# Patient Record
Sex: MALE | Race: WHITE
[De-identification: names, ages, dates, MRNs, and addresses within clinical notes are randomized per-mention and may not be internally consistent; named-entity substitution may affect disease eponyms.]

---

## 2018-05-20 ENCOUNTER — HOSPITAL ENCOUNTER (EMERGENCY)
Dept: HOSPITAL 88 - ER | Age: 23
LOS: 1 days | Discharge: HOME | End: 2018-05-21
Payer: SELF-PAY

## 2018-05-20 VITALS — HEIGHT: 75 IN | BODY MASS INDEX: 35.68 KG/M2 | WEIGHT: 287 LBS

## 2018-05-20 DIAGNOSIS — R09.1: Primary | ICD-10-CM

## 2018-05-20 DIAGNOSIS — R06.00: ICD-10-CM

## 2018-05-20 DIAGNOSIS — N30.90: ICD-10-CM

## 2018-05-20 LAB
ALBUMIN SERPL-MCNC: 4.1 G/DL (ref 3.5–5)
ALBUMIN/GLOB SERPL: 1.1 {RATIO} (ref 0.8–2)
ALP SERPL-CCNC: 61 IU/L (ref 40–150)
ALT SERPL-CCNC: 52 IU/L (ref 0–55)
AMPHETAMINES UR QL SCN>1000 NG/ML: NEGATIVE
ANION GAP SERPL CALC-SCNC: 14.3 MMOL/L (ref 8–16)
BACTERIA URNS QL MICRO: (no result) /HPF
BASOPHILS # BLD AUTO: 0 10*3/UL (ref 0–0.1)
BASOPHILS NFR BLD AUTO: 0.4 % (ref 0–1)
BENZODIAZ UR QL SCN: NEGATIVE
BILIRUB UR QL: NEGATIVE
BUN SERPL-MCNC: 11 MG/DL (ref 7–26)
BUN/CREAT SERPL: 13 (ref 6–25)
CALCIUM SERPL-MCNC: 9.3 MG/DL (ref 8.4–10.2)
CHLORIDE SERPL-SCNC: 109 MMOL/L (ref 98–107)
CK MB SERPL-MCNC: 0.7 NG/ML (ref 0–5)
CK SERPL-CCNC: 100 IU/L (ref 30–200)
CLARITY UR: (no result)
CO2 SERPL-SCNC: 22 MMOL/L (ref 22–29)
COLOR UR: YELLOW
DEPRECATED APTT PLAS QN: 27.5 SECONDS (ref 23.8–35.5)
DEPRECATED INR PLAS: 1.05
DEPRECATED NEUTROPHILS # BLD AUTO: 2.6 10*3/UL (ref 2.1–6.9)
DEPRECATED RBC URNS MANUAL-ACNC: (no result) /HPF (ref 0–5)
EGFRCR SERPLBLD CKD-EPI 2021: > 60 ML/MIN (ref 60–?)
EOSINOPHIL # BLD AUTO: 0.1 10*3/UL (ref 0–0.4)
EOSINOPHIL NFR BLD AUTO: 1.1 % (ref 0–6)
EPI CELLS URNS QL MICRO: (no result) /LPF
ERYTHROCYTE [DISTWIDTH] IN CORD BLOOD: 12.8 % (ref 11.7–14.4)
GLOBULIN PLAS-MCNC: 3.6 G/DL (ref 2.3–3.5)
GLUCOSE SERPLBLD-MCNC: 102 MG/DL (ref 74–118)
HCT VFR BLD AUTO: 46.9 % (ref 38.2–49.6)
HGB BLD-MCNC: 16 G/DL (ref 14–18)
KETONES UR QL STRIP.AUTO: NEGATIVE
LEUKOCYTE ESTERASE UR QL STRIP.AUTO: (no result)
LYMPHOCYTES # BLD: 1.5 10*3/UL (ref 1–3.2)
LYMPHOCYTES NFR BLD AUTO: 33 % (ref 18–39.1)
MCH RBC QN AUTO: 27.6 PG (ref 28–32)
MCHC RBC AUTO-ENTMCNC: 34.1 G/DL (ref 31–35)
MCV RBC AUTO: 80.9 FL (ref 81–99)
MONOCYTES # BLD AUTO: 0.5 10*3/UL (ref 0.2–0.8)
MONOCYTES NFR BLD AUTO: 9.9 % (ref 4.4–11.3)
NEUTS SEG NFR BLD AUTO: 55.4 % (ref 38.7–80)
NITRITE UR QL STRIP.AUTO: NEGATIVE
PCP UR QL SCN: NEGATIVE
PLATELET # BLD AUTO: 205 X10E3/UL (ref 140–360)
POTASSIUM SERPL-SCNC: 3.3 MMOL/L (ref 3.5–5.1)
PROT UR QL STRIP.AUTO: NEGATIVE
PROTHROMBIN TIME: 12.9 SECONDS (ref 11.9–14.5)
RBC # BLD AUTO: 5.8 X10E6/UL (ref 4.3–5.7)
SODIUM SERPL-SCNC: 142 MMOL/L (ref 136–145)
SP GR UR STRIP: 1.02 (ref 1.01–1.02)
UROBILINOGEN UR STRIP-MCNC: 0.2 MG/DL (ref 0.2–1)

## 2018-05-20 PROCEDURE — 85730 THROMBOPLASTIN TIME PARTIAL: CPT

## 2018-05-20 PROCEDURE — 84484 ASSAY OF TROPONIN QUANT: CPT

## 2018-05-20 PROCEDURE — 71260 CT THORAX DX C+: CPT

## 2018-05-20 PROCEDURE — 71046 X-RAY EXAM CHEST 2 VIEWS: CPT

## 2018-05-20 PROCEDURE — 81001 URINALYSIS AUTO W/SCOPE: CPT

## 2018-05-20 PROCEDURE — 36415 COLL VENOUS BLD VENIPUNCTURE: CPT

## 2018-05-20 PROCEDURE — 85379 FIBRIN DEGRADATION QUANT: CPT

## 2018-05-20 PROCEDURE — 82553 CREATINE MB FRACTION: CPT

## 2018-05-20 PROCEDURE — 85610 PROTHROMBIN TIME: CPT

## 2018-05-20 PROCEDURE — 85025 COMPLETE CBC W/AUTO DIFF WBC: CPT

## 2018-05-20 PROCEDURE — 80053 COMPREHEN METABOLIC PANEL: CPT

## 2018-05-20 PROCEDURE — 80307 DRUG TEST PRSMV CHEM ANLYZR: CPT

## 2018-05-20 PROCEDURE — 99284 EMERGENCY DEPT VISIT MOD MDM: CPT

## 2018-05-20 PROCEDURE — 93005 ELECTROCARDIOGRAM TRACING: CPT

## 2018-05-20 PROCEDURE — 82550 ASSAY OF CK (CPK): CPT

## 2018-05-20 NOTE — DIAGNOSTIC IMAGING REPORT
EXAMINATION:  CHEST 2 VIEWS    



INDICATION:           Chest pain, left rib pain. 



COMPARISON:  None

     

FINDINGS:

TUBES and LINES:  None.



LUNGS:  Lungs are well inflated.  Lungs are clear.   There is no evidence of

pneumonia or pulmonary edema.



PLEURA:  No pleural effusion or pneumothorax.



HEART AND MEDIASTINUM:  The cardiomediastinal silhouette is unremarkable.    



BONES AND SOFT TISSUES:  No acute osseous lesion.  Soft tissues are

unremarkable.



UPPER ABDOMEN: No free air under the diaphragm.    



IMPRESSION: 

No acute thoracic abnormality.





Signed by: Dr. Nathen Norman M.D. on 5/20/2018 10:01 PM

## 2018-05-21 NOTE — DIAGNOSTIC IMAGING REPORT
EXAM: CT Chest WITH contrast 5/20/2018 11:39 PM

INDICATION: Pulmonary embolism, shortness of breath 

COMPARISON: None

TECHNIQUE:

Chest was scanned utilizing a multidetector helical scanner from the lung apex

through the level of the adrenal glands without administration of IV contrast.

Coronal and sagittal reformations were obtained. Pulmonary embolism protocol

was performed.

           IV CONTRAST: 100 mL of Isovue-370

           RADIATION DOSE: Total DLP: 706.41 mGy*cm

            Estimated effective dose: (DLP x 0.014 x size factor) mSv

           COMPLICATIONS: None



FINDINGS:



LINES/ TUBES: None.



LUNGS AND AIRWAYS:  The lungs are unremarkable.  Airways are normal.



PLEURA: The pleural spaces are clear.



HEART AND MEDIASTINUM: The thyroid gland is normal.  No mediastinal, hilar or

axillary lymphadenopathy.  The heart is normal in size.. There is no

pericardial effusion.     



UPPER ABDOMEN: Limited non-contrast views of the upper abdomen show no

abnormality within the visualized liver, spleen, pancreas, or kidneys. The

adrenal glands are normal.



BONES: The visualized bony thorax is within normal limits.



SOFT TISSUES: Unremarkable.



IMPRESSION: 

1.  No evidence of acute intrathoracic abnormality.

2.  No evidence of PE





Signed by: Dr. Nathen Norman M.D. on 5/21/2018 12:43 AM

## 2018-10-27 ENCOUNTER — HOSPITAL ENCOUNTER (EMERGENCY)
Dept: HOSPITAL 88 - ER | Age: 23
Discharge: HOME | End: 2018-10-27
Payer: SELF-PAY

## 2018-10-27 VITALS — DIASTOLIC BLOOD PRESSURE: 112 MMHG | SYSTOLIC BLOOD PRESSURE: 148 MMHG

## 2018-10-27 VITALS — WEIGHT: 295 LBS | HEIGHT: 75 IN | BODY MASS INDEX: 36.68 KG/M2

## 2018-10-27 DIAGNOSIS — L05.01: Primary | ICD-10-CM

## 2018-10-27 PROCEDURE — 99283 EMERGENCY DEPT VISIT LOW MDM: CPT

## 2018-10-27 NOTE — XMS REPORT
Clinical Summary

                             Created on: 10/27/2018



Malcolm Talley

External Reference #: [id number]

: 1995

Sex: Male



Demographics







                          Address                   [address]

[place]  [address]

 

                          Home Phone                [phone]

 

                          Preferred Language        English

 

                          Marital Status            Single

 

                          Jew Affiliation     Unknown

 

                          Race                      White

 

                          Ethnic Group              Non-





Author







                          Author                    [facility]

 

                          Organization              [facility]

 

                          Address                   Unknown

 

                          Phone                     Unavailable







Support







                Name            Relationship    Address         Phone

 

                Marielle Palacios            Unknown         [phone]







Care Team Providers







                    Care Team Member Name    Role                Phone

 

                    Asked, No Pcp       PCP                 Unavailable







Allergies

No Known Allergies



Current Medications

No known medications



Active Problems





Not on file



Encounters







    



              Date         Type         Specialty     Care Team     Description

 

    



              2018     Emergency     Emergency Medicine     Gustavo Munoz MD     Palpitations

 (Primary Dx)



after 10/26/2017



Social History







    



              Tobacco Use     Types        Packs/Day     Years Used     Date

 

    



                                         Never Smoker    

 

    



                                         Smokeless Tobacco: Never   



                                         Used   









   



                 Alcohol Use     Drinks/Week     oz/Week         Comments

 

   



                           Yes                       "socially."









 



                           Sex Assigned at Birth     Date Recorded

 

 



                                         Not on file 







Last Filed Vital Signs







  



                     Vital Sign          Reading             Time Taken

 

  



                     Blood Pressure      170/96              2018  8:41 AM CDT

 

  



                     Pulse               50                  2018  8:41 AM CDT

 

  



                     Temperature         36.7 C (98.1 F)     2018  8:41 AM CDT

 

  



                     Respiratory Rate     18                  2018  8:41 AM CDT

 

  



                     Oxygen Saturation     97%                 2018  8:41 AM CDT

 

  



                     Inhaled Oxygen      -                   -



                                         Concentration  

 

  



                     Weight              -                   -

 

  



                     Height              182.9 cm (6')       2018  6:12 AM CDT

 

  



                     Body Mass Index     -                   -







Plan of Treatment







   



                 Health Maintenance     Due Date        Last Done       Comments

 

   



                           INFLUENZA VACCINE         2018  







Procedures







    



              Procedure Name     Priority     Date/Time     Associated Diagnosis     Comments

 

    



                 XR CHEST 2 VW     STAT            2018      Results for this



                           7:14 AM CDT               procedure are in the



                                         results section.

 

    



                 ECG ED PRELIMINARY     Routine         2018      Results for this



                     INTERPRETATION      6:40 AM CDT         procedure are in the



                                         results section.

 

    



                 ECG 12-LEAD     STAT            2018      Results for this



                           6:30 AM CDT               procedure are in the



                                         results section.

 

    



                 D-DIMER         STAT            2018      Results for this



                           6:25 AM CDT               procedure are in the



                                         results section.

 

    



                 ZZESTIMATED GFR     STAT            2018      Results for this



                           6:25 AM CDT               procedure are in the



                                         results section.

 

    



                 B NATRIURETIC PEPTIDE     STAT            2018      Results for this



                           6:25 AM CDT               procedure are in the



                                         results section.

 

    



                 TROPONIN        STAT            2018      Results for this



                           6:25 AM CDT               procedure are in the



                                         results section.

 

    



                 COMPREHENSIVE METABOLIC     STAT            2018      Results for this



                     PANEL               6:25 AM CDT         procedure are in the



                                         results section.

 

    



                 HC COMPLETE BLD COUNT     STAT            2018      Results for this



                     W/AUTO DIFF         6:25 AM CDT         procedure are in the



                                         results section.



after 10/26/2017



Results

* XR Chest 2 Vw (2018  7:14 AM)





 



                           Narrative                 Performed At

 

 



                           EXAMINATION:XR CHEST 2 VW      [facility]



                                         CLINICAL HISTORY:Chest Pain 



                                         COMPARISON:No prior 



                                         IMPRESSION: 



                                         1. The heart and pulmonary vasculature are normal. There are no acute 



                                         infiltrates or effusions. The lungs are clear. Osseous structures 



                                         intact. 



                                         [facility]-[id number] 









                                        Procedure Note

 

                                        



Hm Interface, Radiology Results Incoming - 2018  7:19 AM CDT



EXAMINATION:  XR CHEST 2 VW



CLINICAL HISTORY:  Chest Pain



COMPARISON:  No prior



IMPRESSION:



                                        1. The heart and pulmonary vasculature are normal. There are no acute infiltrates

 or effusions. The lungs are clear. Osseous structures intact.    



[facility]-[id number]











   



                 Performing Organization     Address         City/State/Zipcode     Phone Number

 

   



                     [facility]          [address] [place] [address] 





* ECG ED Preliminary Interpretation - NOT AN ORDER (2018  6:40 AM)





 



                           Narrative                 Performed At

 

 



                                         Gustavo Munoz MD 2018 10:48 PM 



                                         ECG ED Preliminary Interpretation - Not an Order 



                                         Performed by: GUSTAVO MUNOZ 



                                         Authorized by: GUSTAVO MUNOZ 



                                         ECG reviewed by ED Physician in the absence of a cardiologist: yes 



                                         Interpretation: 



                                         Interpretation: normal 



                                         Rate: 



                                         ECG rate:65 



                                         ECG rate assessment: normal 



                                         Rhythm: 



                                         Rhythm: sinus rhythm 



                                         Ectopy: 



                                         Ectopy: none 



                                         QRS: 



                                         QRS axis:Normal 



                                         Conduction: 



                                         Conduction: normal 



                                         ST segments: 



                                         ST segments:Normal 



                                         T waves: 



                                         T waves: normal 





* ECG 12 lead (2018  6:30 AM)





   



                 Component       Value           Ref Range       Performed At

 

   



                     Ventricular rate     65                  [facility]

 

   



                     Atrial rate         65                  [facility]

 

   



                     AL interval         128                 [facility]

 

   



                     QRSD interval       94                  [facility]

 

   



                     QT interval         372                 [facility]

 

   



                     QTC interval        386                 [facility]

 

   



                     P axis 1            53                  [facility]

 

   



                     QRS axis 1          49                  [facility]

 

   



                     T wave axis         57                  [facility]

 

   



                     EKG impression      Normal sinus rhythm with sinus      [facility]



                                         arrhythmia-Normal ECG-No  



                                         previous ECGs  



                                         available-Electronically  



                                         Signed By William Pickard MD  



                                         ([id number]) on 2018 10:20:43  



                                         PM  









   



                 Performing Organization     Address         City/State/Zipcode     Phone Number

 

   



                     [facility]            [address] [place] [address] 





* Estimated GFR (2018  6:25 AM)





   



                 Component       Value           Ref Range       Performed At

 

   



                 GFR Non Af Amer     82              mL/min/1.73 m2     [facility] DEPARTMENT OF



                                         PATHOLOGY AND



                                         [facility]

 

   



                 GFR Af Amer     >90             mL/min/1.73 m2     [facility] DEPARTMENT OF



                           Comment:                  PATHOLOGY AND



                           Chronic kidney disease: <60      GENOMIC MEDICINE



                                         mL/min/1.73m2  



                                         Kidney failure: <15  



                                         mL/min/1.73m2  



                                         The estimated GFR is  



                                         calculated from the  



                                         IDMS-traceable Modification of  



                                         Diet  



                                         in Renal Disease Equation. The  



                                         accuracy of the calculation is  



                                         poor when the  



                                         creatinine is normal.  



                                         Calculated values >90  



                                         mL/min/1.73m2 are not  



                                         reported.  



                                         This equation has not been  



                                         validated in children (<18  



                                         years), pregnant  



                                         women, the elderly (>70  



                                         years), or ethnic groups other  



                                         than Caucasians and  



                                          Americans.  













                                         Specimen

 





                                         Plasma specimen









   



                 Performing Organization     Address         City[facility]     Phone Number

 

   



                     [facility]     [address] [place] [address] 



                                         PATHOLOGY AND [employer] [facility]   



                                         MEDICINE   





* Troponin (2018  6:25 AM)





   



                 Component       Value           Ref Range       Performed At

 

   



                 Troponin        <0.01           0.00 - 0.60 ng/mL     [facility] DEPARTMENT OF



                           Comment:                  PATHOLOGY AND



                           0.11 - 1.49               GENOMIC MEDICINE



                                         ng/mlMay  



                                         indicate increased risk of  



                                         acute  



                                           



                                          coronary  



                                         syndrome.  



                                           



                                           



                                         >=1.5  



                                         ng/ml  



                                         Consistent with acute  



                                         myocardial  



                                           



                                          infarction.  



                                           



                                           



                                           



                                           



                                           



                                         The diagnostic value of a  



                                         single normal or  



                                         non-diagnostic  



                                         result is  



                                         questionable.Serial  



                                         samples at 2-6 hour intervals  



                                         are required to rule out acute  



                                         myocardial  



                                         injury.  



                                           



                                           













                                         Specimen

 





                                         Plasma specimen









   



                 Performing Organization     Address         City/[facility]     Phone Number

 

   



                     [facility]     [address] [place] [address] 



                                         PATHOLOGY AND GENOMIC   



                                         MEDICINE   





* D-dimer (2018  6:25 AM)





   



                 Component       Value           Ref Range       Performed At

 

   



                 D-dimer         <0.27           0.00 - 0.40 ug/mL FEU     [facility] DEPARTMENT OF



                           Comment:                  PATHOLOGY AND



                           Units are ug/ml Fibrinogen      GENOMIC MEDICINE



                                         Equivalent Unit.  



                                         When combined with low  



                                         clinical probability, D-dimer  



                                         results  



                                         of less than 0.5 ug/ml FEU  



                                         have a good  



                                         negativepredictive  



                                         value in excluding PE or  



                                         DVT.  



                                         For D-dimer results greater  



                                         than 0.5ug/ml FEU further  



                                         testing is indicated if PE or  



                                         DVT is  



                                         suspectedclinically.  



                                         Elevated D-dimer results have  



                                         been reported in DVT, PE, and  



                                         DIC cases and may indicate the  



                                         presence of a clot.  



                                         D-dimer results may be  



                                         elevated due to old age,  



                                         pregnancy,  



                                         inflammatory diseases, trauma,  



                                         post-operative states, sepsis,

  



                                         and malignancies.  













                                         Specimen

 





                                         Blood









   



                 Performing Organization     Address         City/State/Zipcode     Phone Number

 

   



                     [facility]     [address] [place] [address] 



                                         PATHOLOGY AND GENOMIC   



                                         MEDICINE   





* CBC with platelet and differential (2018  6:25 AM)





   



                 Component       Value           Ref Range       Performed At

 

   



                 WBC             4.5             4.2 - 11.0 k/uL     [facility] DEPARTMENT 



                                         PATHOLOGY AND



                                         GENOMIC MEDICINE

 

   



                 RBC             5.65            4.04 - 5.86 m/uL     [facility]



                                         PATHOLOGY AND



                                         GENOMIC MEDICINE

 

   



                 HGB             15.3            13.0 - 17.3 g/dL     [facility]



                                         PATHOLOGY AND



                                         GENOMIC MEDICINE

 

   



                 HCT             46.8 (H)        34.0 - 45.0 %     [facility]



                                         PATHOLOGY AND



                                         GENOMIC MEDICINE

 

   



                 MCV             82.8            80.0 - 98.0 fL     [facility]



                                         PATHOLOGY AND



                                         GENOMIC MEDICINE

 

   



                 MCH             27.1            27.0 - 34.0 pg     [facility]



                                         PATHOLOGY AND



                                         GENOMIC MEDICINE

 

   



                 MCHC            32.7            31.5 - 36.5 g/dL     [facility] DEPARTMENT 



                                         PATHOLOGY AND



                                         GENOMIC MEDICINE

 

   



                 RDW - SD        38.0            37.0 - 51.0 fL     [facility] DEPARTMENT 



                                         PATHOLOGY AND



                                         GENOMIC MEDICINE

 

   



                 MPV             11.7 (H)        7.4 - 10.4 fL     [facility] DEPARTMENT 



                                         PATHOLOGY AND



                                         GENOMIC MEDICINE

 

   



                 Platelet count     208             150 - 400 k/uL     [facility] DEPARTMENT 



                                         PATHOLOGY AND



                                         GENOMIC MEDICINE

 

   



                 Nucleated RBC     0.00            /100 WBC        [facility] DEPARTMENT OF



                                         PATHOLOGY AND



                                         GENOMIC MEDICINE

 

   



                 Neutrophils     47.9            36.0 - 66.0 %     [facility] DEPARTMENT 



                                         PATHOLOGY AND



                                         GENOMIC MEDICINE

 

   



                 Lymphocytes     40.8            24.0 - 44.0 %     [facility] DEPARTMENT 



                                         PATHOLOGY AND



                                         GENOMIC MEDICINE

 

   



                 Monocytes       8.4 (H)         0.0 - 6.0 %     [facility] DEPARTMENT OF



                                         PATHOLOGY AND



                                         GENOMIC MEDICINE

 

   



                 Eosinophils     2.0             0.0 - 6.0 %     [facility]



                                         PATHOLOGY AND



                                         GENOMIC MEDICINE

 

   



                 Basophils       0.7             0.0 - 1.2 %     [facility] DEPARTMENT OF



                                         PATHOLOGY AND



                                         GENOMIC MEDICINE

 

   



                 Immature granulocytes     0.2             0.0 - 1.0 %     [facility] DEPARTMENT OF



                                         PATHOLOGY AND



                                         GENOMIC MEDICINE













                                         Specimen

 





                                         Blood









   



                 Performing Organization     Address         City/[facility]     Phone Number

 

   



                     [place] [address] [place] [address] 



                                         PATHOLOGY AND GENOMIC   



                                         MEDICINE   





* B natriuretic peptide (2018  6:25 AM)





   



                 Component       Value           Ref Range       Performed At

 

   



                 BNP             14              0 - 100 pg/mL     [facility] DEPARTMENT OF



                                         PATHOLOGY AND



                                         GENOMIC MEDICINE













                                         Specimen

 





                                         Blood









   



                 Performing Organization     Address         City/[facility]     Phone Number

 

   



                     [address] [place] [address] 



                                         PATHOLOGY AND GENOMIC   



                                         MEDICINE   





* Comprehensive metabolic panel (2018  6:25 AM)





   



                 Component       Value           Ref Range       Performed At

 

   



                 Sodium          141             135 - 150 mEq/L     [facility] DEPARTMENT OF



                                         PATHOLOGY AND



                                         GENOMIC MEDICINE

 

   



                 Potassium       3.4 (L)         3.5 - 5.0 mEq/L     [facility] DEPARTMENT OF



                                         PATHOLOGY AND



                                         GENOMIC MEDICINE

 

   



                 Chloride        106             100 - 109 mEq/L     [facility] DEPARTMENT OF



                                         PATHOLOGY AND



                                         GENOMIC MEDICINE

 

   



                 CO2             28              24 - 32 mmol/L     [facility] DEPARTMENT OF



                                         PATHOLOGY AND



                                         GENOMIC MEDICINE

 

   



                 Anion gap       7@ANIO          7 - 15 mEq/L     [facility] DEPARTMENT OF



                                         PATHOLOGY AND



                                         GENOMIC MEDICINE

 

   



                 BUN             11              7 - 18 mg/dL     [facility] DEPARTMENT OF



                                         PATHOLOGY AND



                                         GENOMIC MEDICINE

 

   



                 Creatinine      1.1             0.8 - 1.5 mg/dL     [facility] DEPARTMENT OF



                                         PATHOLOGY AND



                                         GENOMIC MEDICINE

 

   



                 Glucose         121 (H)         65 - 100 mg/dL     [facility] DEPARTMENT OF



                                         PATHOLOGY AND



                                         GENOMIC MEDICINE

 

   



                 Calcium         8.5 (L)         8.6 - 10.7 mg/dL     [facility] DEPARTMENT OF



                                         PATHOLOGY AND



                                         GENOMIC MEDICINE

 

   



                 Protein         7.6             6.3 - 8.2 g/dL     [facility] DEPARTMENT OF



                                         PATHOLOGY AND



                                         GENOMIC MEDICINE

 

   



                 Albumin         3.9             3.2 - 5.0 g/dL     [facility] DEPARTMENT OF



                                         PATHOLOGY AND



                                         GENOMIC MEDICINE

 

   



                 A/G ratio       1.1             0.7 - 3.8       [facility] DEPARTMENT OF



                                         PATHOLOGY AND



                                         GENOMIC MEDICINE

 

   



                 Alkaline phosphatase     64              30 - 120 U/L     [facility] DEPARTMENT OF



                                         PATHOLOGY AND



                                         GENOMIC MEDICINE

 

   



                 AST             29              15 - 37 U/L     [facility] DEPARTMENT OF



                                         PATHOLOGY AND



                                         GENOMIC MEDICINE

 

   



                 ALT             61              30 - 65 U/L     [facility] DEPARTMENT OF



                                         PATHOLOGY AND



                                         GENOMIC MEDICINE

 

   



                 Total bilirubin     0.6             0.2 - 1.2 mg/dL     [facility] DEPARTMENT OF



                                         PATHOLOGY AND



                                         GENOMIC MEDICINE













                                         Specimen

 





                                         Plasma specimen









   



                 Performing Organization     Address         [facility]     Phone Number

 

   



                     [place] [address] [place] [address] 



                                         PATHOLOGY AND GENOMIC   



                                         MEDICINE   





after 10/26/2017

## 2018-10-27 NOTE — XMS REPORT
Patient Summary Document

                             Created on: 10/27/2018



VINITA DIAZ

External Reference #: [id number]

: 1995

Sex: Male



Demographics







                          Address                   [address] [place] [address]  [address]

 

                          Home Phone                [phone]

 

                          Preferred Language        Unknown

 

                          Marital Status            Unknown

 

                          Jew Affiliation     Unknown

 

                          Race                      Unknown

 

                          Ethnic Group              Unknown





Author







                          Author                    [facility]

 

                          Address                   Unknown

 

                          Phone                     Unavailable







Care Team Providers







                    Care Team Member Name    Role                Phone

 

                    MARK MARTINEZ    Unavailable         Unavailable







Problems

This patient has no known problems.



Allergies, Adverse Reactions, Alerts

This patient has no known allergies or adverse reactions.



Medications

This patient has no known medications.



Results







           Test Description    Test Time    Test Comments    Text Results    Atomic Results    Result

 Comments

 

                CT CHEST W                                          [place] [address]      Patient Name: VINITA DIAZ 
 MR #: [id number]    : 1995 Age/Sex: 22/M  Acct #: [id number] Req #: 
[id number]  Adm Physician:     Ordered by: RONI MARTINEZ MD  Report #: 
[id number]   Location: ER  Room/Bed:     
_________________________________________________________________
__________________________________    Procedure: [id number] CT/CT CHEST W  Exam 
Date: 18                            Exam Time: 1065       REPORT STATUS: 
Signed    EXAM: CT Chest WITH contrast 2018 11:39 PM   INDICATION: 
Pulmonary embolism, shortness of breath    COMPARISON: None   TECHNIQUE:   Chest
was scanned utilizing a multidetector helical scanner from the lung apex   
through the level of the adrenal glands without administration of IV contrast.  
Coronal and sagittal reformations were obtained. Pulmonary embolism protocol   
was performed.              IV CONTRAST: 100 mL of Isovue-370              
RADIATION DOSE: Total DLP: 706.41 mGy*cm               Estimated effective dose:
(DLP x 0.014 x size factor) mSv              COMPLICATIONS: None      FINDINGS: 
    LINES/ TUBES: None.      LUNGS AND AIRWAYS:  The lungs are unremarkable.  
Airways are normal.      PLEURA: The pleural spaces are clear.      HEART AND 
MEDIASTINUM: The thyroid gland is normal.  No mediastinal, hilar or   axillary 
lymphadenopathy.  The heart is normal in size.. There is no   pericardial 
effusion.           UPPER ABDOMEN: Limited non-contrast views of the upper 
abdomen show no   abnormality within the visualized liver, spleen, pancreas, or 
kidneys. The   adrenal glands are normal.      BONES: The visualized bony thorax
is within normal limits.      SOFT TISSUES: Unremarkable.      IMPRESSION:    1.
 No evidence of acute intrathoracic abnormality.   2.  No evidence of PE        
Signed by: Dr. Nathen Norman M.D. on 2018 12:43 AM        Dictated By: NATHEN ZUNIGA MD  Electronically Signed By: NATHEN ZUNIGA MD on 
18  Transcribed By: MINDY on 18       COPY TO:   
RONI MARTINEZ MD                     

 

                CHEST 2 VIEWS                                        [place] [address]      Patient Name: VINITA DIAZ
  MR #: [id number]    : 1995 Age/Sex: 22/M  Acct #: [id number] Req #:
[id number]  Adm Physician:     Ordered by: RONI MARTINEZ MD  Report #: 
[id number]   Location: ER  Room/Bed:     
_________________________________________________________________
__________________________________    Procedure: [id number] DX/CHEST 2 VIEWS  
Exam Date: 18                            Exam Time:        REPORT 
STATUS: Signed    EXAMINATION:  CHEST 2 VIEWS          INDICATION:           
Chest pain, left rib pain.       COMPARISON:  None           FINDINGS:   TUBES 
and LINES:  None.      LUNGS:  Lungs are well inflated.  Lungs are clear.   
There is no evidence of   pneumonia or pulmonary edema.      PLEURA:  No pleural
effusion or pneumothorax.      HEART AND MEDIASTINUM:  The cardiomediastinal 
silhouette is unremarkable.          BONES AND SOFT TISSUES:  No acute osseous 
lesion.  Soft tissues are   unremarkable.      UPPER ABDOMEN: No free air under 
the diaphragm.          IMPRESSION:    No acute thoracic abnormality.         
Signed by: Dr. Nathen Norman M.D. on 2018 10:01 PM        Dictated By: NATHEN ZUNIGA MD  Electronically Signed By: NATHEN ZUNIGA MD on 
18  Transcribed By: MINDY on 18       COPY TO:   RONI CASPER MD

## 2022-09-02 ENCOUNTER — HOSPITAL ENCOUNTER (EMERGENCY)
Dept: HOSPITAL 88 - ER | Age: 27
LOS: 1 days | Discharge: HOME | End: 2022-09-03
Payer: COMMERCIAL

## 2022-09-02 VITALS — WEIGHT: 295 LBS | HEIGHT: 75 IN | BODY MASS INDEX: 36.68 KG/M2

## 2022-09-02 DIAGNOSIS — R60.9: Primary | ICD-10-CM

## 2022-09-02 LAB
BASOPHILS # BLD AUTO: 0 10*3/UL (ref 0–0.1)
BASOPHILS NFR BLD AUTO: 0.6 % (ref 0–1)
DEPRECATED NEUTROPHILS # BLD AUTO: 2.2 10*3/UL (ref 2.1–6.9)
EOSINOPHIL # BLD AUTO: 0.1 10*3/UL (ref 0–0.4)
EOSINOPHIL NFR BLD AUTO: 1.8 % (ref 0–6)
ERYTHROCYTE [DISTWIDTH] IN CORD BLOOD: 12.6 % (ref 11.7–14.4)
HCT VFR BLD AUTO: 45.1 % (ref 38.2–49.6)
HGB BLD-MCNC: 14.5 G/DL (ref 14–18)
LYMPHOCYTES # BLD: 2.1 10*3/UL (ref 1–3.2)
LYMPHOCYTES NFR BLD AUTO: 41.7 % (ref 18–39.1)
MCH RBC QN AUTO: 27.8 PG (ref 28–32)
MCHC RBC AUTO-ENTMCNC: 32.2 G/DL (ref 31–35)
MCV RBC AUTO: 86.6 FL (ref 81–99)
MONOCYTES # BLD AUTO: 0.6 10*3/UL (ref 0.2–0.8)
MONOCYTES NFR BLD AUTO: 11.1 % (ref 4.4–11.3)
NEUTS SEG NFR BLD AUTO: 44.6 % (ref 38.7–80)
PLATELET # BLD AUTO: 203 X10E3/UL (ref 140–360)
RBC # BLD AUTO: 5.21 X10E6/UL (ref 4.3–5.7)

## 2022-09-02 PROCEDURE — 71045 X-RAY EXAM CHEST 1 VIEW: CPT

## 2022-09-02 PROCEDURE — 84484 ASSAY OF TROPONIN QUANT: CPT

## 2022-09-02 PROCEDURE — 82553 CREATINE MB FRACTION: CPT

## 2022-09-02 PROCEDURE — 99283 EMERGENCY DEPT VISIT LOW MDM: CPT

## 2022-09-02 PROCEDURE — 93005 ELECTROCARDIOGRAM TRACING: CPT

## 2022-09-02 PROCEDURE — 85025 COMPLETE CBC W/AUTO DIFF WBC: CPT

## 2022-09-02 PROCEDURE — 80053 COMPREHEN METABOLIC PANEL: CPT

## 2022-09-02 PROCEDURE — 36415 COLL VENOUS BLD VENIPUNCTURE: CPT

## 2022-09-02 PROCEDURE — 83880 ASSAY OF NATRIURETIC PEPTIDE: CPT

## 2022-09-02 PROCEDURE — 82550 ASSAY OF CK (CPK): CPT

## 2022-09-03 LAB
ALBUMIN SERPL-MCNC: 3.8 G/DL (ref 3.5–5)
ALBUMIN/GLOB SERPL: 1.1 {RATIO} (ref 0.8–2)
ALP SERPL-CCNC: 64 IU/L (ref 40–150)
ALT SERPL-CCNC: 47 IU/L (ref 0–55)
ANION GAP SERPL CALC-SCNC: 13.5 MMOL/L (ref 8–16)
BUN SERPL-MCNC: 8 MG/DL (ref 7–26)
BUN/CREAT SERPL: 9 (ref 6–25)
CALCIUM SERPL-MCNC: 8.4 MG/DL (ref 8.4–10.2)
CHLORIDE SERPL-SCNC: 107 MMOL/L (ref 98–107)
CK MB SERPL-MCNC: 3.5 NG/ML (ref 0–5)
CK SERPL-CCNC: 356 IU/L (ref 30–200)
CO2 SERPL-SCNC: 25 MMOL/L (ref 22–29)
GLOBULIN PLAS-MCNC: 3.6 G/DL (ref 2.3–3.5)
GLUCOSE SERPLBLD-MCNC: 128 MG/DL (ref 74–118)
POTASSIUM SERPL-SCNC: 3.5 MMOL/L (ref 3.5–5.1)
SODIUM SERPL-SCNC: 142 MMOL/L (ref 136–145)